# Patient Record
Sex: MALE | Race: WHITE | NOT HISPANIC OR LATINO | Employment: FULL TIME | URBAN - METROPOLITAN AREA
[De-identification: names, ages, dates, MRNs, and addresses within clinical notes are randomized per-mention and may not be internally consistent; named-entity substitution may affect disease eponyms.]

---

## 2019-04-09 ENCOUNTER — HOSPITAL ENCOUNTER (OUTPATIENT)
Dept: RADIOLOGY | Facility: MEDICAL CENTER | Age: 59
End: 2019-04-09
Attending: ORTHOPAEDIC SURGERY
Payer: COMMERCIAL

## 2019-04-09 DIAGNOSIS — M25.561 RIGHT KNEE PAIN, UNSPECIFIED CHRONICITY: ICD-10-CM

## 2019-04-09 PROCEDURE — 73721 MRI JNT OF LWR EXTRE W/O DYE: CPT | Mod: RT

## 2019-05-13 ENCOUNTER — APPOINTMENT (OUTPATIENT)
Dept: ADMISSIONS | Facility: MEDICAL CENTER | Age: 59
End: 2019-05-13
Attending: ORTHOPAEDIC SURGERY
Payer: COMMERCIAL

## 2019-05-13 NOTE — OR NURSING
"Reviewed \"Preparing for your surgery\", except advised to stay NPO after midnight according to Dr. Teran orders. Instructed to take pain meds DOS prn.  "

## 2019-05-14 ENCOUNTER — ANESTHESIA EVENT (OUTPATIENT)
Dept: SURGERY | Facility: MEDICAL CENTER | Age: 59
End: 2019-05-14
Payer: COMMERCIAL

## 2019-05-14 ENCOUNTER — ANESTHESIA (OUTPATIENT)
Dept: SURGERY | Facility: MEDICAL CENTER | Age: 59
End: 2019-05-14
Payer: COMMERCIAL

## 2019-05-14 ENCOUNTER — HOSPITAL ENCOUNTER (OUTPATIENT)
Facility: MEDICAL CENTER | Age: 59
End: 2019-05-14
Attending: ORTHOPAEDIC SURGERY | Admitting: ORTHOPAEDIC SURGERY
Payer: COMMERCIAL

## 2019-05-14 VITALS
HEART RATE: 59 BPM | OXYGEN SATURATION: 95 % | TEMPERATURE: 97.3 F | RESPIRATION RATE: 16 BRPM | HEIGHT: 75 IN | DIASTOLIC BLOOD PRESSURE: 88 MMHG | WEIGHT: 196.87 LBS | BODY MASS INDEX: 24.48 KG/M2 | SYSTOLIC BLOOD PRESSURE: 135 MMHG

## 2019-05-14 DIAGNOSIS — G89.18 POSTOPERATIVE PAIN: ICD-10-CM

## 2019-05-14 PROCEDURE — 160041 HCHG SURGERY MINUTES - EA ADDL 1 MIN LEVEL 4: Performed by: ORTHOPAEDIC SURGERY

## 2019-05-14 PROCEDURE — 700111 HCHG RX REV CODE 636 W/ 250 OVERRIDE (IP): Performed by: ANESTHESIOLOGY

## 2019-05-14 PROCEDURE — 700102 HCHG RX REV CODE 250 W/ 637 OVERRIDE(OP): Performed by: ANESTHESIOLOGY

## 2019-05-14 PROCEDURE — 160029 HCHG SURGERY MINUTES - 1ST 30 MINS LEVEL 4: Performed by: ORTHOPAEDIC SURGERY

## 2019-05-14 PROCEDURE — 160009 HCHG ANES TIME/MIN: Performed by: ORTHOPAEDIC SURGERY

## 2019-05-14 PROCEDURE — 700102 HCHG RX REV CODE 250 W/ 637 OVERRIDE(OP)

## 2019-05-14 PROCEDURE — 160002 HCHG RECOVERY MINUTES (STAT): Performed by: ORTHOPAEDIC SURGERY

## 2019-05-14 PROCEDURE — 700111 HCHG RX REV CODE 636 W/ 250 OVERRIDE (IP)

## 2019-05-14 PROCEDURE — A6222 GAUZE <=16 IN NO W/SAL W/O B: HCPCS | Performed by: ORTHOPAEDIC SURGERY

## 2019-05-14 PROCEDURE — A9270 NON-COVERED ITEM OR SERVICE: HCPCS

## 2019-05-14 PROCEDURE — 501838 HCHG SUTURE GENERAL: Performed by: ORTHOPAEDIC SURGERY

## 2019-05-14 PROCEDURE — 700105 HCHG RX REV CODE 258: Performed by: ANESTHESIOLOGY

## 2019-05-14 PROCEDURE — 160025 RECOVERY II MINUTES (STATS): Performed by: ORTHOPAEDIC SURGERY

## 2019-05-14 PROCEDURE — 700105 HCHG RX REV CODE 258: Performed by: ORTHOPAEDIC SURGERY

## 2019-05-14 PROCEDURE — 502580 HCHG PACK, KNEE ARTHROSCOPY: Performed by: ORTHOPAEDIC SURGERY

## 2019-05-14 PROCEDURE — 700101 HCHG RX REV CODE 250

## 2019-05-14 PROCEDURE — A9270 NON-COVERED ITEM OR SERVICE: HCPCS | Performed by: ANESTHESIOLOGY

## 2019-05-14 PROCEDURE — 700101 HCHG RX REV CODE 250: Performed by: ORTHOPAEDIC SURGERY

## 2019-05-14 PROCEDURE — 700101 HCHG RX REV CODE 250: Performed by: ANESTHESIOLOGY

## 2019-05-14 PROCEDURE — 160046 HCHG PACU - 1ST 60 MINS PHASE II: Performed by: ORTHOPAEDIC SURGERY

## 2019-05-14 PROCEDURE — 160035 HCHG PACU - 1ST 60 MINS PHASE I: Performed by: ORTHOPAEDIC SURGERY

## 2019-05-14 PROCEDURE — 160048 HCHG OR STATISTICAL LEVEL 1-5: Performed by: ORTHOPAEDIC SURGERY

## 2019-05-14 RX ORDER — OXYCODONE HYDROCHLORIDE 5 MG/1
5 TABLET ORAL EVERY 6 HOURS PRN
Qty: 20 TAB | Refills: 0 | Status: SHIPPED | OUTPATIENT
Start: 2019-05-14 | End: 2019-05-19

## 2019-05-14 RX ORDER — MIDAZOLAM HYDROCHLORIDE 1 MG/ML
1 INJECTION INTRAMUSCULAR; INTRAVENOUS
Status: DISCONTINUED | OUTPATIENT
Start: 2019-05-14 | End: 2019-05-14 | Stop reason: HOSPADM

## 2019-05-14 RX ORDER — SODIUM CHLORIDE, SODIUM LACTATE, POTASSIUM CHLORIDE, CALCIUM CHLORIDE 600; 310; 30; 20 MG/100ML; MG/100ML; MG/100ML; MG/100ML
INJECTION, SOLUTION INTRAVENOUS
Status: DISCONTINUED | OUTPATIENT
Start: 2019-05-14 | End: 2019-05-14 | Stop reason: SURG

## 2019-05-14 RX ORDER — LABETALOL HYDROCHLORIDE 5 MG/ML
5 INJECTION, SOLUTION INTRAVENOUS
Status: DISCONTINUED | OUTPATIENT
Start: 2019-05-14 | End: 2019-05-14 | Stop reason: HOSPADM

## 2019-05-14 RX ORDER — MEPERIDINE HYDROCHLORIDE 25 MG/ML
12.5 INJECTION INTRAMUSCULAR; INTRAVENOUS; SUBCUTANEOUS
Status: DISCONTINUED | OUTPATIENT
Start: 2019-05-14 | End: 2019-05-14 | Stop reason: HOSPADM

## 2019-05-14 RX ORDER — CELECOXIB 200 MG/1
400 CAPSULE ORAL ONCE
Status: COMPLETED | OUTPATIENT
Start: 2019-05-14 | End: 2019-05-14

## 2019-05-14 RX ORDER — BUPIVACAINE HYDROCHLORIDE AND EPINEPHRINE 2.5; 5 MG/ML; UG/ML
INJECTION, SOLUTION EPIDURAL; INFILTRATION; INTRACAUDAL; PERINEURAL
Status: DISCONTINUED | OUTPATIENT
Start: 2019-05-14 | End: 2019-05-14 | Stop reason: HOSPADM

## 2019-05-14 RX ORDER — DIAZEPAM 5 MG/1
5 TABLET ORAL ONCE
Status: COMPLETED | OUTPATIENT
Start: 2019-05-14 | End: 2019-05-14

## 2019-05-14 RX ORDER — KETOROLAC TROMETHAMINE 30 MG/ML
INJECTION, SOLUTION INTRAMUSCULAR; INTRAVENOUS PRN
Status: DISCONTINUED | OUTPATIENT
Start: 2019-05-14 | End: 2019-05-14 | Stop reason: SURG

## 2019-05-14 RX ORDER — CEFAZOLIN SODIUM 1 G/3ML
INJECTION, POWDER, FOR SOLUTION INTRAMUSCULAR; INTRAVENOUS PRN
Status: DISCONTINUED | OUTPATIENT
Start: 2019-05-14 | End: 2019-05-14 | Stop reason: SURG

## 2019-05-14 RX ORDER — HYDROMORPHONE HYDROCHLORIDE 1 MG/ML
0.4 INJECTION, SOLUTION INTRAMUSCULAR; INTRAVENOUS; SUBCUTANEOUS
Status: DISCONTINUED | OUTPATIENT
Start: 2019-05-14 | End: 2019-05-14 | Stop reason: HOSPADM

## 2019-05-14 RX ORDER — HYDRALAZINE HYDROCHLORIDE 20 MG/ML
5 INJECTION INTRAMUSCULAR; INTRAVENOUS
Status: DISCONTINUED | OUTPATIENT
Start: 2019-05-14 | End: 2019-05-14 | Stop reason: HOSPADM

## 2019-05-14 RX ORDER — HALOPERIDOL 5 MG/ML
1 INJECTION INTRAMUSCULAR
Status: DISCONTINUED | OUTPATIENT
Start: 2019-05-14 | End: 2019-05-14 | Stop reason: HOSPADM

## 2019-05-14 RX ORDER — DIPHENHYDRAMINE HYDROCHLORIDE 50 MG/ML
12.5 INJECTION INTRAMUSCULAR; INTRAVENOUS
Status: DISCONTINUED | OUTPATIENT
Start: 2019-05-14 | End: 2019-05-14 | Stop reason: HOSPADM

## 2019-05-14 RX ORDER — ACETAMINOPHEN 500 MG
1000 TABLET ORAL ONCE
Status: COMPLETED | OUTPATIENT
Start: 2019-05-14 | End: 2019-05-14

## 2019-05-14 RX ORDER — SODIUM CHLORIDE, SODIUM LACTATE, POTASSIUM CHLORIDE, CALCIUM CHLORIDE 600; 310; 30; 20 MG/100ML; MG/100ML; MG/100ML; MG/100ML
INJECTION, SOLUTION INTRAVENOUS CONTINUOUS
Status: DISCONTINUED | OUTPATIENT
Start: 2019-05-14 | End: 2019-05-14 | Stop reason: HOSPADM

## 2019-05-14 RX ORDER — OXYCODONE HCL 5 MG/5 ML
10 SOLUTION, ORAL ORAL
Status: DISCONTINUED | OUTPATIENT
Start: 2019-05-14 | End: 2019-05-14 | Stop reason: HOSPADM

## 2019-05-14 RX ORDER — HYDROMORPHONE HYDROCHLORIDE 1 MG/ML
0.2 INJECTION, SOLUTION INTRAMUSCULAR; INTRAVENOUS; SUBCUTANEOUS
Status: DISCONTINUED | OUTPATIENT
Start: 2019-05-14 | End: 2019-05-14 | Stop reason: HOSPADM

## 2019-05-14 RX ORDER — ONDANSETRON 2 MG/ML
4 INJECTION INTRAMUSCULAR; INTRAVENOUS
Status: DISCONTINUED | OUTPATIENT
Start: 2019-05-14 | End: 2019-05-14 | Stop reason: HOSPADM

## 2019-05-14 RX ORDER — OXYCODONE HCL 5 MG/5 ML
5 SOLUTION, ORAL ORAL
Status: DISCONTINUED | OUTPATIENT
Start: 2019-05-14 | End: 2019-05-14 | Stop reason: HOSPADM

## 2019-05-14 RX ORDER — OXYCODONE HCL 5 MG/5 ML
SOLUTION, ORAL ORAL
Status: COMPLETED
Start: 2019-05-14 | End: 2019-05-14

## 2019-05-14 RX ORDER — HYDROMORPHONE HYDROCHLORIDE 1 MG/ML
0.1 INJECTION, SOLUTION INTRAMUSCULAR; INTRAVENOUS; SUBCUTANEOUS
Status: DISCONTINUED | OUTPATIENT
Start: 2019-05-14 | End: 2019-05-14 | Stop reason: HOSPADM

## 2019-05-14 RX ADMIN — ROCURONIUM BROMIDE 30 MG: 10 INJECTION INTRAVENOUS at 17:23

## 2019-05-14 RX ADMIN — CEFAZOLIN 2 G: 1 INJECTION, POWDER, FOR SOLUTION INTRAVENOUS at 17:26

## 2019-05-14 RX ADMIN — FENTANYL CITRATE 50 MCG: 50 INJECTION INTRAMUSCULAR; INTRAVENOUS at 18:06

## 2019-05-14 RX ADMIN — HYDROMORPHONE HYDROCHLORIDE 0.2 MG: 1 INJECTION, SOLUTION INTRAMUSCULAR; INTRAVENOUS; SUBCUTANEOUS at 19:03

## 2019-05-14 RX ADMIN — CELECOXIB 400 MG: 200 CAPSULE ORAL at 16:53

## 2019-05-14 RX ADMIN — DIAZEPAM 5 MG: 5 TABLET ORAL at 16:54

## 2019-05-14 RX ADMIN — PROPOFOL 200 MG: 10 INJECTION, EMULSION INTRAVENOUS at 17:23

## 2019-05-14 RX ADMIN — SODIUM CHLORIDE, POTASSIUM CHLORIDE, SODIUM LACTATE AND CALCIUM CHLORIDE: 600; 310; 30; 20 INJECTION, SOLUTION INTRAVENOUS at 17:18

## 2019-05-14 RX ADMIN — OXYCODONE HYDROCHLORIDE 10 MG: 5 SOLUTION ORAL at 18:07

## 2019-05-14 RX ADMIN — LIDOCAINE HYDROCHLORIDE 0.5 ML: 10 INJECTION, SOLUTION INFILTRATION; PERINEURAL at 15:11

## 2019-05-14 RX ADMIN — ACETAMINOPHEN 1000 MG: 500 TABLET, FILM COATED ORAL at 16:53

## 2019-05-14 RX ADMIN — KETOROLAC TROMETHAMINE 30 MG: 30 INJECTION, SOLUTION INTRAMUSCULAR at 17:50

## 2019-05-14 RX ADMIN — LIDOCAINE HYDROCHLORIDE 100 MG: 20 INJECTION, SOLUTION INFILTRATION; PERINEURAL at 17:23

## 2019-05-14 RX ADMIN — HYDROMORPHONE HYDROCHLORIDE 0.4 MG: 1 INJECTION, SOLUTION INTRAMUSCULAR; INTRAVENOUS; SUBCUTANEOUS at 18:58

## 2019-05-14 RX ADMIN — SODIUM CHLORIDE, POTASSIUM CHLORIDE, SODIUM LACTATE AND CALCIUM CHLORIDE: 600; 310; 30; 20 INJECTION, SOLUTION INTRAVENOUS at 15:11

## 2019-05-14 RX ADMIN — FENTANYL CITRATE 50 MCG: 50 INJECTION INTRAMUSCULAR; INTRAVENOUS at 18:12

## 2019-05-14 RX ADMIN — FENTANYL CITRATE 100 MCG: 50 INJECTION, SOLUTION INTRAMUSCULAR; INTRAVENOUS at 17:30

## 2019-05-14 NOTE — ANESTHESIA PREPROCEDURE EVALUATION
Relevant Problems   No relevant active problems       Physical Exam    Airway   Mallampati: I  TM distance: >3 FB  Neck ROM: full       Cardiovascular - normal exam  Rhythm: regular  Rate: normal  (-) murmur     Dental - normal exam         Pulmonary - normal exam  Breath sounds clear to auscultation     Abdominal    Neurological - normal exam                 Anesthesia Plan    ASA 1       Plan - general       Airway plan will be LMA        Induction: intravenous    Postoperative Plan: Postoperative administration of opioids is intended.    Pertinent diagnostic labs and testing reviewed    Informed Consent:    Anesthetic plan and risks discussed with patient.

## 2019-05-14 NOTE — OR NURSING
Patient to preop, allergies and NPO status verified, home medications reconciled, belongings secured, verbalizes understanding of pain scale, surgical site verified, IV access established by HEIDY Figueroas in place, triple aim completed.

## 2019-05-15 NOTE — OP REPORT
DATE OF SERVICE:  05/14/2019    PREOPERATIVE DIAGNOSIS:  Right knee degenerative joint disease with medial and   lateral meniscal tears.    POSTOPERATIVE DIAGNOSES:  1.  Right knee degenerative medial and lateral meniscal tears.  2.  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  3.  Synovitis of the knee with a large cyclops lesion on the anterior aspect   of the knee from the ACL and anterior horn of the lateral meniscus and diffuse   synovitis throughout the knee.    PROCEDURES PERFORMED:  1.  Right knee diagnostic arthroscopy with arthroscopic partial medial and   lateral meniscectomies.  2.  Right knee arthroscopic chondroplasty of the patellofemoral, medial, and   lateral compartments.  3.  Right knee arthroscopic synovectomy.    SURGEON:  Freddy Teran MD    ASSISTANT:  Radha Cotto PA-C    ANESTHESIA:  General.    ANESTHESIOLOGIST:  Mikal Driscoll MD    IMPLANTS:  None.    COMPLICATIONS:  None.    DISPOSITION:  Stable to postanesthesia care unit.    INDICATIONS:  The patient has had progressive knee pain, which has been   unresponsive to conservative management.  He has had other arthroscopic   procedures in the past and he has known degenerative changes of the knee.  He   has been having mechanical symptoms and desired to have a knee arthroscopy to   address some of those mechanical symptoms and potentially buy him some time   before arthroplasty.  The risks, benefits, alternatives, and limitations of   the surgical intervention were discussed in detail.  He expressed   understanding and desired to proceed.    DESCRIPTION OF PROCEDURE:  The patient and the correct operative extremity   were identified in the preoperative area.  The knee was marked.  He was   brought to the operating room and the correct operative extremity was  again   confirmed.  He was placed supine on the OR table where he underwent general   anesthesia without complication.  Examination under anesthesia showed full   range of motion, no instability and a moderate effusion.  The knee was prepped   with alcohol and injected with 30 mL of 1% lidocaine with epinephrine.  The   knee was then prepped and draped in the usual sterile fashion using   ChloraPrep.  A diagnostic arthroscopy was then performed, which showed diffuse   grade III chondromalacia of the patella and the trochlea with areas of grade   IV chondromalacia and multiple grade III loose chondral flaps.  The notch   showed an intact ACL.  The medial compartment showed areas of grade IV   chondromalacia of the medial aspect of the medial femoral condyle and the   medial tibial plateau.  He had a horizontal cleavage tear of the posterior   horn of the medial meniscus and he had a previously partially resected   meniscus.  A revision partial medial meniscectomy was performed removing the   superior leaflet of the posterior horn horizontal cleavage tear.    Chondroplasty was performed of the medial femoral condyle and the medial   tibial plateau.  The lateral compartment showed areas of grade IV   chondromalacia along the medial aspect of the lateral femoral condyle and the   lateral tibial plateau, multiple areas of grade III loose chondral flaps,   complex tear of the posterior horn of the lateral meniscus.  Partial lateral   meniscectomy was performed with a duckbill resector and the arthroscopic   shaver.  There was a large ____ up area just anterior to the ACL and off of   the anterior horn of the lateral meniscus, which was pinching in the notch in   full extension and that area was debrided.  It was either a flap from the   anterior horn of the lateral meniscus or from the ACL.  There was diffuse   synovitis in the knee with multiple small loose chondral bodies throughout the   knee.  Synovectomy was  performed removing inflamed synovium from the notch,   the retropatellar fat pad, the medial and lateral compartments, suprapatellar   pouch just removing the loose tissue, trying not induce any significant   bleeding.  Chondroplasty was performed of the trochlea and the patella.  The   gutters were checked for loose bodies, none were identified.  A spinal needle   was placed into the suprapatellar pouch under direct vision.  The fluid was   removed from the knee.  The scope was withdrawn.  The portals were closed with   3-0 nylon.  The knee injected with a combination of 0.5% bupivacaine with   epinephrine and 1 mL of 80 mg/mL of Depo-Medrol.  Sterile dressings were   applied.  The knee was loosely overwrapped with an Ace wrap.  The patient was   then allowed to awake from anesthesia, transferred to his hospital cart, and   taken to the postanesthesia care unit in stable condition.  He tolerated the   procedure well.  There were no immediate complications.       ____________________________________     MAYITO CHAUHAN MD RD / ENEDINA    DD:  05/14/2019 17:55:32  DT:  05/14/2019 18:08:26    D#:  [id number]  Job#:  [id number]

## 2019-05-15 NOTE — ANESTHESIA POSTPROCEDURE EVALUATION
Patient: Jim Lee    Procedure Summary     Date:  05/14/19 Room / Location:   OR 04 / SURGERY [facility]    Anesthesia Start:  1718 Anesthesia Stop:  1801    Procedures:       ARTHROSCOPY, KNEE (Right Knee)      MENISCECTOMY, KNEE, MEDIAL-  PARTIAL, AND PARTIAL  LATERAL MENISCECTOMY (Knee)      CHONDROPLASTY-  AND REPAIRS AS INDICATED (Knee) Diagnosis:  (PAIN IN RIGHT KNEE)    Surgeon:  Freddy Teran M.D. Responsible Provider:  Mikal Driscoll M.D.    Anesthesia Type:  general ASA Status:  1          Final Anesthesia Type: general  Last vitals  BP   Blood Pressure: 135/88    Temp   36.8 °C (98.2 °F)    Pulse   Pulse: (!) 59   Resp   16    SpO2   98 %      Anesthesia Post Evaluation    Patient location during evaluation: PACU  Patient participation: complete - patient participated  Level of consciousness: awake and alert    Airway patency: patent  Anesthetic complications: no  Cardiovascular status: hemodynamically stable  Respiratory status: acceptable  Hydration status: euvolemic    PONV: none           Nurse Pain Score: 5 (NPRS)

## 2019-05-15 NOTE — OR NURSING
1757 To PACU from OR via ledyralina. Pt sleeping, respirations spontaneous and non-labored via LMA. Surgical dressing to right knee. Toes to affected extremity are pink and warm, brisk cap refill observed, pedal pulse palpable.    1759 Pt rouses spontaneously, LMA removed. Pt reports 9/10 pain to his right knee, denies nausea. Oral and IV pain medications given.    1810 Pt still reporting severe pain, another dose of IV pain medication given.    1815 Pt asking for a sandwich and the score to the hockey game. Pt requests his belongings bag and his glasses, pulls out his iPad and proceeds to watch said hockey game.     1825 Pt quietly watching hockey game.     1835 Report to JACEY Dailey.

## 2019-05-15 NOTE — ANESTHESIA TIME REPORT
Anesthesia Start and Stop Event Times     Date Time Event    5/14/2019 1718 Anesthesia Start     1801 Anesthesia Stop        Responsible Staff  05/14/19    Name Role Begin End    Mikal Driscoll M.D. Anesth 1718 1801        Preop Diagnosis (Free Text):  Pre-op Diagnosis     PAIN IN RIGHT KNEE        Preop Diagnosis (Codes):  Diagnosis Information     Diagnosis Code(s):         Post op Diagnosis  Pain in right knee      Premium Reason  A. 3PM - 7AM    Comments:

## 2019-05-15 NOTE — ANESTHESIA PROCEDURE NOTES
Airway  Date/Time: 5/14/2019 5:24 PM  Performed by: ASTRID MALONE  Authorized by: ASTRID MALONE     Location:  OR  Urgency:  Elective  Difficult Airway: No    Indications for Airway Management:  Anesthesia  Spontaneous Ventilation: absent    Sedation Level:  Deep  Preoxygenated: Yes    Mask Difficulty Assessment:  1 - vent by mask  Final Airway Type:  Supraglottic airway  Final Supraglottic Airway:  Standard LMA  SGA Size:  5  Number of Attempts at Approach:  1

## 2019-05-15 NOTE — ANESTHESIA QCDR
2019 [facility] Clinical Data Registry (for Quality Improvement)     Postoperative nausea/vomiting risk protocol (Adult = 18 yrs and Pediatric 3-17 yrs)- (430 and 463)  General inhalation anesthetic (NOT TIVA) with PONV risk factors: No  Provision of anti-emetic therapy with at least 2 different classes of agents: N/A  Patient DID NOT receive anti-emetic therapy and reason is documented in Medical Record: N/A    Multimodal Pain Management- (AQI59)  Patient undergoing Elective Surgery (i.e. Outpatient, or ASC, or Prescheduled Surgery prior to Hospital Admission): Yes  Use of Multimodal Pain Management, two or more drugs and/or interventions, NOT including systemic opioids: Yes   Exception: Documented allergy to multiple classes of analgesics:  N/A    PACU assessment of acute postoperative pain prior to Anesthesia Care End- Applies to Patients Age = 18- (ABG7)  Initial PACU pain score is which of the following: < 7/10  Patient unable to report pain score: N/A    Post-anesthetic transfer of care checklist/protocol to PACU/ICU- (426 and 427)  Upon conclusion of case, patient transferred to which of the following locations: PACU/Non-ICU  Use of transfer checklist/protocol: Yes  Exclusion: Service Performed in Patient Hospital Room (and thus did not require transfer): N/A    PACU Reintubation- (AQI31)  General anesthesia requiring endotracheal intubation (ETT) along with subsequent extubation in OR or PACU: No  Required reintubation in the PACU: N/A  Extubation was a planned trial documented in the medical record prior to removal of the original airway device: N/A    Unplanned admission to ICU related to anesthesia service up through end of PACU care- (MD51)  Unplanned admission to ICU (not initially anticipated at anesthesia start time): No

## 2019-05-15 NOTE — DISCHARGE INSTRUCTIONS
ACTIVITY: Rest and take it easy for the first 24 hours.  A responsible adult is recommended to remain with you during that time.  It is normal to feel sleepy.  We encourage you to not do anything that requires balance, judgment or coordination.    MILD FLU-LIKE SYMPTOMS ARE NORMAL. YOU MAY EXPERIENCE GENERALIZED MUSCLE ACHES, THROAT IRRITATION, HEADACHE AND/OR SOME NAUSEA.    FOR 24 HOURS DO NOT:  Drive, operate machinery or run household appliances.  Drink beer or alcoholic beverages.   Make important decisions or sign legal documents.    SPECIAL INSTRUCTIONS:  Ice and elevate extremity. Weight bear as tolerated, crutches for comfort.    DIET: To avoid nausea, slowly advance diet as tolerated, avoiding spicy or greasy foods for the first day.  Add more substantial food to your diet according to your physician's instructions.  Babies can be fed formula or breast milk as soon as they are hungry.  INCREASE FLUIDS AND FIBER TO AVOID CONSTIPATION.    SURGICAL DRESSING/BATHING: May remove dressings Thursday and shower with wound uncovered.  Apply bandaids after shower.  Do not soak or submerge incisions for two weeks.    FOLLOW-UP APPOINTMENT:  A follow-up appointment should be arranged with your doctor in 7-10 days; call to schedule.    You should CALL YOUR PHYSICIAN if you develop:  Fever greater than 101 degrees F.  Pain not relieved by medication, or persistent nausea or vomiting.  Excessive bleeding (blood soaking through dressing) or unexpected drainage from the wound.  Extreme redness or swelling around the incision site, drainage of pus or foul smelling drainage.  Inability to urinate or empty your bladder within 8 hours.  Problems with breathing or chest pain.    You should call 911 if you develop problems with breathing or chest pain.  If you are unable to contact your doctor or surgical center, you should go to the nearest emergency room or urgent care center.      Physician's telephone #: [phone] (  [place])    If any questions arise, call your doctor.  If your doctor is not available, please feel free to call the Surgical Center at [phone]. The Center is open Monday through Friday from 7AM to 7PM.  You can also call the HEALTH HOTLINE open 24 hours/day, 7 days/week and speak to a nurse at [phone], or toll free at [phone].    A registered nurse may call you a few days after your surgery to see how you are doing after your procedure.    MEDICATIONS: Resume taking daily medication.  Take prescribed pain medication with food.  If no medication is prescribed, you may take non-aspirin pain medication if needed.  PAIN MEDICATION CAN BE VERY CONSTIPATING.  Take a stool softener or laxative such as senokot, pericolace, or milk of magnesia if needed.    Prescription given oxycodone.  Last pain medication given at 6:07 pm.    If your physician has prescribed pain medication that includes Acetaminophen (Tylenol), do not take additional Acetaminophen (Tylenol) while taking the prescribed medication.    Depression / Suicide Risk    As you are discharged from this [facility] facility, it is important to learn how to keep safe from harming yourself.    Recognize the warning signs:  · Abrupt changes in personality, positive or negative- including increase in energy   · Giving away possessions  · Change in eating patterns- significant weight changes-  positive or negative  · Change in sleeping patterns- unable to sleep or sleeping all the time   · Unwillingness or inability to communicate  · Depression  · Unusual sadness, discouragement and loneliness  · Talk of wanting to die  · Neglect of personal appearance   · Rebelliousness- reckless behavior  · Withdrawal from people/activities they love  · Confusion- inability to concentrate     If you or a loved one observes any of these behaviors or has concerns about self-harm, here's what you can do:  · Talk about it- your feelings and reasons for harming  yourself  · Remove any means that you might use to hurt yourself (examples: pills, rope, extension cords, firearm)  · Get professional help from the community (Mental Health, Substance Abuse, psychological counseling)  · Do not be alone:Call your Safe Contact- someone whom you trust who will be there for you.  · Call your local CRISIS HOTLINE 152-3838 or 440-499-2031  · Call your local Children's Mobile Crisis Response Team [place] (901) 720-3193 or www.[employer]  · Call the toll free National Suicide Prevention Hotlines   · National Suicide Prevention Lifeline 154-949-RZGK (8380)  · National Hope Line Network 800-SUICIDE (783-2825)

## 2019-05-15 NOTE — OR NURSING
Patient OOB to recliner, grimacing, states pain in knee still high.  Dilaudid given with good effect. Palpable pedal pulses right foot, foot is warm and pink with brisk cap refill, ice to knee.  ACE wrap clean and dry.

## 2020-01-21 ENCOUNTER — HOSPITAL ENCOUNTER (OUTPATIENT)
Dept: RADIOLOGY | Facility: MEDICAL CENTER | Age: 60
End: 2020-01-21
Attending: INTERNAL MEDICINE
Payer: COMMERCIAL

## 2020-01-21 DIAGNOSIS — R10.30 INGUINAL PAIN, UNSPECIFIED LATERALITY: ICD-10-CM

## 2020-01-21 DIAGNOSIS — R63.0 ANOREXIA: ICD-10-CM

## 2020-01-21 DIAGNOSIS — R63.4 LOSS OF WEIGHT: ICD-10-CM

## 2020-01-21 DIAGNOSIS — R11.0 NAUSEA: ICD-10-CM

## 2020-01-21 PROCEDURE — 700117 HCHG RX CONTRAST REV CODE 255: Performed by: INTERNAL MEDICINE

## 2020-01-21 PROCEDURE — 74177 CT ABD & PELVIS W/CONTRAST: CPT

## 2020-01-21 RX ADMIN — IOHEXOL 100 ML: 350 INJECTION, SOLUTION INTRAVENOUS at 09:30

## 2020-01-21 RX ADMIN — IOHEXOL 50 ML: 240 INJECTION, SOLUTION INTRATHECAL; INTRAVASCULAR; INTRAVENOUS; ORAL at 09:30

## 2020-06-25 ENCOUNTER — APPOINTMENT (OUTPATIENT)
Dept: RADIOLOGY | Facility: MEDICAL CENTER | Age: 60
End: 2020-06-25
Attending: SPECIALIST
Payer: COMMERCIAL

## 2020-07-28 ENCOUNTER — HOSPITAL ENCOUNTER (OUTPATIENT)
Dept: RADIOLOGY | Facility: MEDICAL CENTER | Age: 60
End: 2020-07-28
Attending: NURSE PRACTITIONER
Payer: COMMERCIAL

## 2020-07-28 DIAGNOSIS — R91.1 PULMONARY NODULE/LESION, SOLITARY: ICD-10-CM

## 2020-07-28 PROCEDURE — 700117 HCHG RX CONTRAST REV CODE 255: Performed by: NURSE PRACTITIONER

## 2020-07-28 PROCEDURE — 71260 CT THORAX DX C+: CPT

## 2020-07-28 RX ADMIN — IOHEXOL 75 ML: 350 INJECTION, SOLUTION INTRAVENOUS at 09:49

## 2021-03-15 DIAGNOSIS — Z23 NEED FOR VACCINATION: ICD-10-CM

## 2023-10-25 ENCOUNTER — OFFICE VISIT (OUTPATIENT)
Dept: MEDICAL GROUP | Facility: MEDICAL CENTER | Age: 63
End: 2023-10-25
Payer: COMMERCIAL

## 2023-10-25 VITALS
HEIGHT: 75 IN | TEMPERATURE: 97.9 F | OXYGEN SATURATION: 97 % | BODY MASS INDEX: 25.59 KG/M2 | SYSTOLIC BLOOD PRESSURE: 110 MMHG | DIASTOLIC BLOOD PRESSURE: 78 MMHG | WEIGHT: 205.8 LBS | RESPIRATION RATE: 16 BRPM | HEART RATE: 68 BPM

## 2023-10-25 DIAGNOSIS — Z00.00 PREVENTATIVE HEALTH CARE: ICD-10-CM

## 2023-10-25 DIAGNOSIS — Z11.59 NEED FOR HEPATITIS C SCREENING TEST: ICD-10-CM

## 2023-10-25 DIAGNOSIS — J31.0 CHRONIC RHINITIS: ICD-10-CM

## 2023-10-25 DIAGNOSIS — G47.09 OTHER INSOMNIA: ICD-10-CM

## 2023-10-25 DIAGNOSIS — Z12.83 SCREENING EXAM FOR SKIN CANCER: ICD-10-CM

## 2023-10-25 DIAGNOSIS — B07.9 WART OF HAND: ICD-10-CM

## 2023-10-25 DIAGNOSIS — B35.1 ONYCHOMYCOSIS: ICD-10-CM

## 2023-10-25 PROCEDURE — 3078F DIAST BP <80 MM HG: CPT | Performed by: PHYSICIAN ASSISTANT

## 2023-10-25 PROCEDURE — 3074F SYST BP LT 130 MM HG: CPT | Performed by: PHYSICIAN ASSISTANT

## 2023-10-25 PROCEDURE — 99204 OFFICE O/P NEW MOD 45 MIN: CPT | Mod: 25 | Performed by: PHYSICIAN ASSISTANT

## 2023-10-25 PROCEDURE — 17110 DESTRUCTION B9 LES UP TO 14: CPT | Performed by: PHYSICIAN ASSISTANT

## 2023-10-25 RX ORDER — TRAZODONE HYDROCHLORIDE 50 MG/1
50 TABLET ORAL NIGHTLY
Qty: 30 TABLET | Refills: 3 | Status: SHIPPED | OUTPATIENT
Start: 2023-10-25

## 2023-10-25 RX ORDER — ALPRAZOLAM 0.5 MG/1
.25-.5 TABLET ORAL NIGHTLY PRN
Qty: 10 TABLET | Refills: 0 | Status: SHIPPED | OUTPATIENT
Start: 2023-10-25 | End: 2023-11-24

## 2023-10-25 RX ORDER — CICLOPIROX 80 MG/ML
SOLUTION TOPICAL
Qty: 6.6 ML | Refills: 5 | Status: SHIPPED | OUTPATIENT
Start: 2023-10-25

## 2023-10-25 ASSESSMENT — PATIENT HEALTH QUESTIONNAIRE - PHQ9: CLINICAL INTERPRETATION OF PHQ2 SCORE: 0

## 2023-10-25 NOTE — LETTER
[facility]  Bonita Freeman P.A.-C.  [address]  [place] [address]  Fax: [phone]   Authorization for Release/Disclosure of   Protected Health Information   Name: GABI LYN : 1960 SSN: [id number]   Address: [address] [place] [address] [place] [address] Phone:    [phone] (home) [phone] (work)   I authorize the entity listed below to release/disclose the PHI below to:   [facility]/Bonita Freeman P.A.-C. and Bonita Freeman P.A.-C.   Provider or Entity Name:  [facility]   Address   [facility], [address]            [address] [place] [address] Phone:  [phone]      Fax:       [phone]        Reason for request: continuity of care   Information to be released:    [ X ] LAST COLONOSCOPY,  including any PATH REPORT and follow-up  [ X ] LAST FIT/COLOGUARD RESULT [  ] LAST DEXA  [  ] LAST MAMMOGRAM  [  ] LAST PAP  [  ] LAST LABS [  ] RETINA EXAM REPORT  [  ] IMMUNIZATION RECORDS  [  ] Release all info      [  ] Check here and initial the line next to each item to release ALL health information INCLUDING  _____ Care and treatment for drug and / or alcohol abuse  _____ HIV testing, infection status, or AIDS  _____ Genetic Testing    DATES OF SERVICE OR TIME PERIOD TO BE DISCLOSED: _____________  I understand and acknowledge that:  * This Authorization may be revoked at any time by you in writing, except if your health information has already been used or disclosed.  * Your health information that will be used or disclosed as a result of you signing this authorization could be re-disclosed by the recipient. If this occurs, your re-disclosed health information may no longer be protected by State or Federal laws.  * You may refuse to sign this Authorization. Your refusal will not affect your ability to obtain treatment.  * This Authorization becomes effective upon signing and will  on (date) __________.      If no date is indicated, this  Authorization will  one (1) year from the signature date.    Name: Jim Lee    Signature:   Date:     10/25/2023       PLEASE FAX REQUESTED RECORDS BACK TO: [phone]

## 2023-10-25 NOTE — PROGRESS NOTES
"Chief Complaint   Patient presents with    Establish Care    Nasal Congestion     \"Consistent\"       HPI  Jim Lee is a 63 y.o. male here today for establishing care.    Patient has chronic rhinitis, runny nose and clogged nose, switched sides.  Has been going on for many years.  Has had allergy testing in the past without any conclusion.  Does not take take medicine for it.    Patient has a wart over middle right hand, has been shaving it at home, not resolving.    Patient has bilateral toe fungus, many years, not improving and not getting worse.    States he has some anxiety that has been well controlled without any medicine however he does have a hard time falling asleep and staying asleep some nights.  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  Psych: Eye contact is good, speech goal directed, affect calm  Eyes: Conjunctiva non-injected, sclera non-icteric.  Lungs: Unlabored respiratory effort, clear to auscultation bilaterally with good excursion, no wheez or rhonci  CV: regular rate and rhythm. No lower extremity edema  Ears:  External ears unremarkable. TMs pearly gray, clear and intact, w/o any perforation or effusion.  Skin/ nail: Verrucous growth over right middle finger  Hypertrophic nails with yellow discoloration over bilateral toenails        A/P:  1. Preventative health care    - Comp Metabolic Panel; Future  - CBC WITH DIFFERENTIAL; Future  - Lipid Profile; Future  - PROSTATE SPECIFIC AG SCREENING; Future  - VITAMIN D,25 HYDROXY (DEFICIENCY); Future  - TSH WITH REFLEX TO FT4; Future    2. Need for hepatitis C screening test    - HEP C VIRUS ANTIBODY; Future    3. Chronic rhinitis  Discussed daily antihistamine, Flonase, nasal " saline, quercetin,  Discussed daily montelukast.  Patient does not want to take daily medicine however will try OTC    - Referral to ENT    4. Wart of hand     CRYOTHERAPY:  Liquid nitrogen was applied for 10-12 seconds to the skin lesion and the expected blistering or scabbing reaction explained. Patient reminded not pick at the area and to expect hypopigmented scars from the procedure. Return if lesion fails to fully resolve.      5. Screening exam for skin cancer    - Referral to Dermatology    6. Onychomycosis  We can consider oral medicine after liver enzymes are checked  - ciclopirox (PENLAC) 8 % solution; Apply to adjacent skin and affected nails daily. Remove with alcohol every 7 days.  Dispense: 6.6 mL; Refill: 5    7. Other insomnia    - ALPRAZolam (XANAX) 0.5 MG Tab; Take 0.5-1 Tablets by mouth at bedtime as needed for Sleep for up to 30 days.  Dispense: 10 Tablet; Refill: 0  - traZODone (DESYREL) 50 MG Tab; Take 1 Tablet by mouth every evening.  Dispense: 30 Tablet; Refill: 3        F/U: 8 wks for f/u and annual exam

## 2024-01-09 ENCOUNTER — APPOINTMENT (OUTPATIENT)
Dept: MEDICAL GROUP | Facility: MEDICAL CENTER | Age: 64
End: 2024-01-09
Payer: COMMERCIAL

## 2024-07-31 ENCOUNTER — APPOINTMENT (RX ONLY)
Dept: URBAN - METROPOLITAN AREA CLINIC 6 | Facility: CLINIC | Age: 64
Setting detail: DERMATOLOGY
End: 2024-07-31

## 2024-07-31 DIAGNOSIS — L82.1 OTHER SEBORRHEIC KERATOSIS: ICD-10-CM

## 2024-07-31 DIAGNOSIS — L57.0 ACTINIC KERATOSIS: ICD-10-CM

## 2024-07-31 PROCEDURE — 17000 DESTRUCT PREMALG LESION: CPT

## 2024-07-31 PROCEDURE — ? LIQUID NITROGEN

## 2024-07-31 PROCEDURE — ? COUNSELING

## 2024-07-31 PROCEDURE — 99202 OFFICE O/P NEW SF 15 MIN: CPT | Mod: 25

## 2024-07-31 ASSESSMENT — LOCATION DETAILED DESCRIPTION DERM
LOCATION DETAILED: LEFT MEDIAL SUPERIOR CHEST
LOCATION DETAILED: RIGHT CLAVICULAR SKIN

## 2024-07-31 ASSESSMENT — LOCATION ZONE DERM: LOCATION ZONE: TRUNK

## 2024-07-31 ASSESSMENT — LOCATION SIMPLE DESCRIPTION DERM
LOCATION SIMPLE: CHEST
LOCATION SIMPLE: RIGHT CLAVICULAR SKIN

## 2024-07-31 NOTE — PROCEDURE: LIQUID NITROGEN
Show Applicator Variable?: Yes
Consent: The patient's verbal consent was obtained including but not limited to risks of crusting, scabbing, blistering, scarring, darker or lighter pigmentary change, recurrence, incomplete removal and infection.
Number Of Freeze-Thaw Cycles: 2 freeze-thaw cycles
Render Post-Care Instructions In Note?: no
Detail Level: Detailed
Duration Of Freeze Thaw-Cycle (Seconds): 8
Post-Care Instructions: I reviewed with the patient in detail post-care instructions. Patient is to wear sunprotection, and avoid picking at any of the treated lesions. Pt may apply Vaseline to crusted or scabbing areas.

## 2024-10-03 ENCOUNTER — OFFICE VISIT (OUTPATIENT)
Dept: MEDICAL GROUP | Facility: MEDICAL CENTER | Age: 64
End: 2024-10-03
Payer: COMMERCIAL

## 2024-10-03 VITALS
HEART RATE: 94 BPM | BODY MASS INDEX: 26.31 KG/M2 | HEIGHT: 75 IN | WEIGHT: 211.6 LBS | DIASTOLIC BLOOD PRESSURE: 60 MMHG | TEMPERATURE: 99.2 F | SYSTOLIC BLOOD PRESSURE: 116 MMHG | OXYGEN SATURATION: 98 %

## 2024-10-03 DIAGNOSIS — I83.813 VARICOSE VEINS OF BOTH LOWER EXTREMITIES WITH PAIN: ICD-10-CM

## 2024-10-03 DIAGNOSIS — R06.00 PND (PAROXYSMAL NOCTURNAL DYSPNEA): ICD-10-CM

## 2024-10-03 DIAGNOSIS — Z00.00 PREVENTATIVE HEALTH CARE: ICD-10-CM

## 2024-10-03 DIAGNOSIS — G47.9 SLEEP DISORDER: ICD-10-CM

## 2024-10-03 DIAGNOSIS — F41.8 SITUATIONAL ANXIETY: ICD-10-CM

## 2024-10-03 PROCEDURE — 3074F SYST BP LT 130 MM HG: CPT | Performed by: PHYSICIAN ASSISTANT

## 2024-10-03 PROCEDURE — 99214 OFFICE O/P EST MOD 30 MIN: CPT | Performed by: PHYSICIAN ASSISTANT

## 2024-10-03 PROCEDURE — 3078F DIAST BP <80 MM HG: CPT | Performed by: PHYSICIAN ASSISTANT

## 2024-10-03 RX ORDER — ALPRAZOLAM 0.5 MG
0.5 TABLET ORAL NIGHTLY PRN
Qty: 30 TABLET | Refills: 0 | Status: SHIPPED | OUTPATIENT
Start: 2024-10-03 | End: 2024-11-02

## 2024-10-03 RX ORDER — MONTELUKAST SODIUM 10 MG/1
10 TABLET ORAL DAILY
Qty: 30 TABLET | Refills: 2 | Status: SHIPPED | OUTPATIENT
Start: 2024-10-03

## 2024-10-03 RX ORDER — TRAZODONE HYDROCHLORIDE 50 MG/1
50 TABLET, FILM COATED ORAL NIGHTLY PRN
Qty: 90 TABLET | Refills: 2 | Status: SHIPPED | OUTPATIENT
Start: 2024-10-03

## 2024-10-03 ASSESSMENT — PATIENT HEALTH QUESTIONNAIRE - PHQ9: CLINICAL INTERPRETATION OF PHQ2 SCORE: 0

## 2025-05-20 ENCOUNTER — HOSPITAL ENCOUNTER (OUTPATIENT)
Dept: RADIOLOGY | Facility: MEDICAL CENTER | Age: 65
End: 2025-05-20
Attending: PHYSICAL MEDICINE & REHABILITATION
Payer: COMMERCIAL

## 2025-05-20 ENCOUNTER — OFFICE VISIT (OUTPATIENT)
Dept: PHYSICAL MEDICINE AND REHAB | Facility: MEDICAL CENTER | Age: 65
End: 2025-05-20
Payer: COMMERCIAL

## 2025-05-20 VITALS
BODY MASS INDEX: 25.36 KG/M2 | SYSTOLIC BLOOD PRESSURE: 136 MMHG | TEMPERATURE: 98.3 F | OXYGEN SATURATION: 95 % | DIASTOLIC BLOOD PRESSURE: 86 MMHG | HEIGHT: 75 IN | WEIGHT: 204 LBS | HEART RATE: 91 BPM

## 2025-05-20 DIAGNOSIS — M25.859 HIP IMPINGEMENT SYNDROME, UNSPECIFIED LATERALITY: ICD-10-CM

## 2025-05-20 DIAGNOSIS — G89.29 CHRONIC BILATERAL LOW BACK PAIN WITHOUT SCIATICA: ICD-10-CM

## 2025-05-20 DIAGNOSIS — M16.0 ARTHRITIS OF BOTH HIPS: ICD-10-CM

## 2025-05-20 DIAGNOSIS — M25.551 CHRONIC PAIN OF RIGHT HIP: Primary | ICD-10-CM

## 2025-05-20 DIAGNOSIS — G89.29 CHRONIC PAIN OF RIGHT HIP: ICD-10-CM

## 2025-05-20 DIAGNOSIS — G89.29 CHRONIC PAIN OF RIGHT HIP: Primary | ICD-10-CM

## 2025-05-20 DIAGNOSIS — M54.50 CHRONIC BILATERAL LOW BACK PAIN WITHOUT SCIATICA: ICD-10-CM

## 2025-05-20 DIAGNOSIS — B35.1 ONYCHOMYCOSIS: ICD-10-CM

## 2025-05-20 DIAGNOSIS — M25.551 CHRONIC PAIN OF RIGHT HIP: ICD-10-CM

## 2025-05-20 PROCEDURE — 72110 X-RAY EXAM L-2 SPINE 4/>VWS: CPT

## 2025-05-20 PROCEDURE — 3075F SYST BP GE 130 - 139MM HG: CPT | Performed by: PHYSICAL MEDICINE & REHABILITATION

## 2025-05-20 PROCEDURE — 99204 OFFICE O/P NEW MOD 45 MIN: CPT | Performed by: PHYSICAL MEDICINE & REHABILITATION

## 2025-05-20 PROCEDURE — 73522 X-RAY EXAM HIPS BI 3-4 VIEWS: CPT

## 2025-05-20 PROCEDURE — 3079F DIAST BP 80-89 MM HG: CPT | Performed by: PHYSICAL MEDICINE & REHABILITATION

## 2025-05-20 RX ORDER — CICLOPIROX 80 MG/ML
SOLUTION TOPICAL
Qty: 6.6 ML | Refills: 5 | Status: SHIPPED | OUTPATIENT
Start: 2025-05-20

## 2025-05-20 ASSESSMENT — PAIN SCALES - GENERAL: PAINLEVEL_OUTOF10: 3=SLIGHT PAIN

## 2025-05-20 ASSESSMENT — PATIENT HEALTH QUESTIONNAIRE - PHQ9: CLINICAL INTERPRETATION OF PHQ2 SCORE: 0

## 2025-05-20 NOTE — Clinical Note
REFERRAL APPROVAL NOTICE         Sent on May 20, 2025                   Jim Lee  [address] [place] [address]                   Dear Mr. Lee,    After a careful review of the medical information and benefit coverage, [facility] has processed your referral. See below for additional details.    If applicable, you must be actively enrolled with your insurance for coverage of the authorized service. If you have any questions regarding your coverage, please contact your insurance directly.    REFERRAL INFORMATION   Referral #:  [id number]  Referred-To Department    Referred-By Provider:  Physical Medicine and Rehab    Patient Self-Referred   Physiatry [facility] [address]      [place]  Referring provider phone N/A [address] [place] [address]  [place] [address] [phone]    Referral Start Date:  05/20/2025  Referral End Date:   05/20/2026             SCHEDULING  If you do not already have an appointment, please call [phone] to make an appointment.     MORE INFORMATION  If you do not already have a [email or web address] account, sign up at: [email or web address]  You can access your medical information, make appointments, see lab results, billing information, and more.  If you have questions regarding this referral, please contact  the [facility] Referrals department at:             [phone]. Monday - Friday 8:00AM - 5:00PM.     Sincerely,    [facility]

## 2025-05-20 NOTE — PROGRESS NOTES
[facility] (Physical Medicine and Rehabilitation)  Interventional Pain and Spine   New Patient Visit      Date of service: See epic    Chief complaint:   Chief Complaint   Patient presents with    New Patient     Back pain        Referring provider: No ref. provider found     HISTORY    HPI: Jim Lee 65 y.o.  who presents today with The primary encounter diagnosis was Chronic pain of right hip. Diagnoses of Hip impingement syndrome, Arthritis of both hips, and Chronic bilateral low back pain without sciatica were also pertinent to this visit.    HPI    Verbal consent was obtained for Christian copilot: Yes      History of Present Illness  The patient is a 65-year-old male with a 10-year history of chronic right-sided low back pain, right hip pain, and right groin pain. The pain is most severe in the morning, often awakening him, and is rated at 7/10 in intensity. He previously received a neural blockade at [facility]. The patient works as a  and engages in regular physical activity, including playing pickleball.    Chronic Right-Sided Low Back Pain, Right Hip Pain, and Right Groin Pain  He reports right hip and groin pain, which varies from a dull ache to a sharp sensation, and sharp lower back pain that is associated with the hip and groin pain. The pain is exacerbated by pressure and certain movements, particularly during daily activities such as playing pickleball or tennis, and sleeping on an uncomfortable bed. The pain disrupts his sleep, and he generally experiences poor sleep quality.  - Onset: 10-year history.  - Location: Right-sided low back, right hip, and right groin.  - Duration: Chronic, ongoing for 10 years.  - Character: Varies from a dull ache to a sharp sensation; sharp lower back pain associated with hip and groin pain.  - Alleviating/Aggravating Factors: Exacerbated by pressure and certain movements, playing pickleball or tennis, and sleeping on an uncomfortable bed.  -  Timing: Most severe in the morning, often awakening him.  - Severity: Rated at 7/10 in intensity; disrupts sleep and results in poor sleep quality.    Post-Surgical Soreness and Stiffness in Legs  The patient has a history of multiple knee surgeries and bilateral knee arthroplasty, with the most recent procedure occurring 5-6 years ago, and he has had no issues post-replacement. He recently underwent a venous procedure on his legs, resulting in soreness and stiffness. He reports some hip pain but does not currently warrant hip replacement. He is concerned about potential ancillary issues.  - Onset: Most recent knee arthroplasty 5-6 years ago; recent venous procedure.  - Location: Legs, hip.  - Duration: Ongoing post-surgical soreness and stiffness.  - Character: Soreness and stiffness in legs; some hip pain.  - Severity: Does not currently warrant hip replacement.    In 2016, he received a lumbar injection at [facility], which provided relief for several years.    FAMILY HISTORY  His father had a hip replacement in his 70s.       Conservative treatments including the past two months within the past six months  NSAIDs: yes  Acetaminophen: yes  Home exercise program or physical therapy: yes  Activity modification: yes           ROS:   Red Flags ROS:   Fever, Chills, Sweats: Denies  Involuntary Weight Loss: Denies  Bladder Incontinence: Denies  Bowel Incontinence: denies  Saddle Anesthesia: Denies    All other systems reviewed and negative.       PMHx:   Past Medical History[1]      Medications Ordered Prior to Encounter[2]     PSHx:   Past Surgical History[3]    Family history   Family History   Problem Relation Age of Onset    Cancer Father         aucostic neuromma, lung cancer         Medications: reviewed on epic.   Active Medications[4]     Allergies:   Allergies[5]    Social Hx:   Social History     Socioeconomic History    Marital status: Single     Spouse name: Not on file    Number of children: Not  "on file    Years of education: Not on file    Highest education level: Not on file   Occupational History    Occupation:    Tobacco Use    Smoking status: Never    Smokeless tobacco: Never   Vaping Use    Vaping status: Never Used   Substance and Sexual Activity    Alcohol use: Yes     Comment: 4 per week    Drug use: Not Currently     Comment: PRN CBD Gummy    Sexual activity: Not Currently     Partners: Female   Other Topics Concern    Not on file   Social History Narrative    Not on file     Social Drivers of Health     Financial Resource Strain: Not on File (2019)    Received from [employer]    Financial Resource Strain     Financial Resource Strain: 0   Food Insecurity: Not on File (2019)    Received from [employer]    Food Insecurity     Food: 0   Transportation Needs: Not on File (2019)    Received from [employer]    Transportation Needs     Transportation: 0   Physical Activity: Not on File (2019)    Received from [employer]    Physical Activity     Physical Activity: 0   Stress: Not on File (2019)    Received from [employer]    Stress     Stress: 0   Social Connections: Not on File (2019)    Received from [employer]    Social Connections     Social Connections and Isolation: 0   Intimate Partner Violence: Not on file   Housing Stability: Not on File (2019)    Received from [employer]    Housing Stability     Housin         EXAMINATION     Physical Exam:   Vitals: /86   Pulse 91   Temp 36.8 °C (98.3 °F) (Temporal)   Ht 1.905 m (6' 3\")   Wt 92.5 kg (204 lb)   SpO2 95%     Constitutional:   Body Habitus: Body mass index is 25.5 kg/m².  Cooperation: Fully cooperates with exam  Appearance: Well-groomed, well-nourished, not disheveled     Eyes: No scleral icterus to suggest severe liver disease, no proptosis to suggest severe hyperthyroid    ENT -no obvious auditory deficits, no obvious tongue lesions, tongue midline, no facial droop     Skin -no rashes or lesions noted " "    Respiratory-  breathing comfortable on room air, no audible wheezing    Cardiovascular- capillary refills less than 2 seconds.     Psychiatric- alert and oriented ×3. Normal affect.     Gait - normal gait, no use of ambulatory device, nonantalgic.     Musculoskeletal and Neuro -     Physical Exam  Decreased range of motion in lumbar spine. Facet loading maneuver positive on right, negative on left. Decreased range of motion in bilateral hips. Rosalia's maneuver positive for right anterior hip pain. Thigh thrust positive for anterior hip pain. No tenderness to palpation throughout spine or sacroiliac joints.         MEDICAL DECISION MAKING    Medical records review: see under HPI section.     DATA    Labs:   Lab Results   Component Value Date/Time    SODIUM 138 03/06/2014 06:38 AM    POTASSIUM 4.0 03/06/2014 06:38 AM    CHLORIDE 108 03/06/2014 06:38 AM    CO2 24 03/06/2014 06:38 AM    ANION 6.0 03/06/2014 06:38 AM    GLUCOSE 91 03/06/2014 06:38 AM    BUN 20 03/06/2014 06:38 AM    CREATININE 1.04 03/06/2014 06:38 AM    CALCIUM 8.4 03/06/2014 06:38 AM   ]    No results found for: \"PROTHROMBTM\", \"INR\"     Lab Results   Component Value Date/Time    WBC 6.4 08/08/2023 09:13 AM    WBC 6.9 03/06/2014 12:30 AM    RBC 4.34 (L) 03/06/2014 12:30 AM    HEMOGLOBIN 15.3 08/08/2023 09:13 AM    HEMOGLOBIN 14.3 03/06/2014 12:30 AM    HEMATOCRIT 45.2 08/08/2023 09:13 AM    HEMATOCRIT 40.7 (L) 03/06/2014 12:30 AM    MCV 98 08/08/2023 09:13 AM    MCV 93.8 03/06/2014 12:30 AM    MCH 33.1 (H) 08/08/2023 09:13 AM    MCH 32.9 03/06/2014 12:30 AM    MCHC 33.8 08/08/2023 09:13 AM    MCHC 35.1 (H) 03/06/2014 12:30 AM    MPV 10.5 (H) 03/06/2014 12:30 AM    NEUTSPOLYS 67 08/08/2023 09:13 AM    NEUTSPOLYS 56.8 03/06/2014 12:30 AM    LYMPHOCYTES 21 08/08/2023 09:13 AM    LYMPHOCYTES 30.4 03/06/2014 12:30 AM    MONOCYTES 8 08/08/2023 09:13 AM    MONOCYTES 8.9 03/06/2014 12:30 AM    EOSINOPHILS 3 08/08/2023 09:13 AM    EOSINOPHILS 3.2 03/06/2014 " "12:30 AM    BASOPHILS 1 08/08/2023 09:13 AM    BASOPHILS 0.7 03/06/2014 12:30 AM        No results found for: \"HBA1C\"     Imaging:   I personally reviewed following images, these are my reads      Results  - CT abdomen and pelvis (01/21/2020):    - Bilateral hip impingement syndrome    - Pit in bilateral anterofemoral head neck junction    - Degenerative changes in acetabulum    - Cam deformity         IMAGING radiology reads. I reviewed the following radiology reads                                                                Results for orders placed in visit on 07/25/23    DX-ANKLE 3+ VIEWS LEFT         Results for orders placed in visit on 01/13/23    DX-ELBOW-COMPLETE 3+ LEFT      Results for orders placed in visit on 07/03/21    DX-FOOT-COMPLETE 3+ LEFT                                      Diagnosis   Visit Diagnoses     ICD-10-CM   1. Chronic pain of right hip  M25.551    G89.29   2. Hip impingement syndrome  M25.859   3. Arthritis of both hips  M16.0   4. Chronic bilateral low back pain without sciatica  M54.50    G89.29           ASSESSMENT AND PLAN:  Jim Pisano Rosa 65 y.o. male      Jim was seen today for new patient.    Diagnoses and all orders for this visit:    Chronic pain of right hip  -     DX-HIP-BILATERAL-WITH PELVIS-3/4 VIEWS; Future  -     [facility] Procedure; Future    Hip impingement syndrome  -     DX-HIP-BILATERAL-WITH PELVIS-3/4 VIEWS; Future  -     [facility] Hospital Procedure; Future    Arthritis of both hips  -     DX-HIP-BILATERAL-WITH PELVIS-3/4 VIEWS; Future  -     [facility] Hospital Procedure; Future    Chronic bilateral low back pain without sciatica  -     DX-LUMBAR SPINE-4+ VIEWS; Future            Assessment & Plan  Chronic right-sided low back, right hip, and right groin pain: Chronic. CT scan (01/21/2020) showing cam deformity and degenerative changes in acetabulum. Primary issue related to hip joint. Possible contributing factor from back due to degenerative changes.  - Obtain " updated x-ray for further evaluation  - Continue current activities as tolerated  - Plan on right intra-articular hip injection for diagnostic and therapeutic purposes with fluoroscopic guidance  - We discussed the risk, benefits and alternatives  - Monitor for potential need for hip replacement in the future  - Request records from [facility]    Follow-up  - After the above procedure    PROCEDURE  Received nerve block in back at [facility] in 2016, providing relief for several years.    History of multiple knee surgeries and bilateral knee replacements, most recent 5-6 years ago.    Recently underwent vein work on legs.          Please note that this dictation was created using voice recognition software. I have made every reasonable attempt to correct obvious errors but there may be errors of grammar and content that I may have overlooked prior to finalization of this note.      Higinio Harrison MD  Physical Medicine and Rehabilitation  Interventional Spine and Sports Physiatry  [facility]          Bonita Freeman P.A.-C.          [1]   Past Medical History:  Diagnosis Date    Anxiety     Arthritis 05/2019    left knee   [2]   Current Outpatient Medications on File Prior to Visit   Medication Sig Dispense Refill    traZODone (DESYREL) 50 MG Tab Take 1 Tablet by mouth at bedtime as needed for Sleep. 90 Tablet 2    traZODone (DESYREL) 50 MG Tab Take 1 Tablet by mouth every evening. 30 Tablet 3    montelukast (SINGULAIR) 10 MG Tab Take 1 Tablet by mouth every day. (Patient not taking: Reported on 5/20/2025) 30 Tablet 2    MELATONIN PO Take  by mouth.       No current facility-administered medications on file prior to visit.   [3]   Past Surgical History:  Procedure Laterality Date    KNEE ARTHROSCOPY Right 5/14/2019    Procedure: ARTHROSCOPY, KNEE;  Surgeon: Freddy Teran M.D.;  Location: SURGERY [facility];  Service: Orthopedics    MENISCECTOMY, KNEE, MEDIAL  5/14/2019     Procedure: MENISCECTOMY, KNEE, MEDIAL-  PARTIAL, AND PARTIAL  LATERAL MENISCECTOMY;  Surgeon: Freddy Teran M.D.;  Location: SURGERY [facility];  Service: Orthopedics    CHONDROPLASTY  5/14/2019    Procedure: CHONDROPLASTY-  AND REPAIRS AS INDICATED;  Surgeon: Freddy Teran M.D.;  Location: SURGERY [facility];  Service: Orthopedics    HARDWARE REMOVAL ORTHO Left 12/2018    clavicle    ORIF, SHOULDER Left 11/2017    plates in shoulder    ORIF, FRACTURE, CLAVICLE Left 11/2017    plate in clavicle    KNEE ARTHROPLASTY TOTAL Left 2014   [4]   Outpatient Medications Marked as Taking for the 5/20/25 encounter (Office Visit) with Higinio Harrison M.D.   Medication Sig Dispense Refill    traZODone (DESYREL) 50 MG Tab Take 1 Tablet by mouth at bedtime as needed for Sleep. 90 Tablet 2    traZODone (DESYREL) 50 MG Tab Take 1 Tablet by mouth every evening. 30 Tablet 3   [5] No Known Allergies

## 2025-06-04 NOTE — Clinical Note
REFERRAL APPROVAL NOTICE         Sent on June 4, 2025                   Jim Lee  [address] [place] [address]                   Dear Mr. Lee,    After a careful review of the medical information and benefit coverage, [facility] has processed your referral. See below for additional details.    If applicable, you must be actively enrolled with your insurance for coverage of the authorized service. If you have any questions regarding your coverage, please contact your insurance directly.    REFERRAL INFORMATION   Referral #:  [id number]  Referred-To Department    Referred-By Provider:  Pain Management    Higinio Harrison M.D.   Pain Management       [address]  [address]  [place] [address]  [phone] [facility]  [place] [address] [phone]    Referral Start Date:  05/20/2025  Referral End Date:   09/04/2025             SCHEDULING  If you do not already have an appointment, please call [phone] to make an appointment.     MORE INFORMATION  If you do not already have a "[employer] account, sign up at: [email or web address]  You can access your medical information, make appointments, see lab results, billing information, and more.  If you have questions regarding this referral, please contact  the [facility] Referrals department at:             [phone]. Monday - Friday 8:00AM - 5:00PM.     Sincerely,    [facility]

## 2025-07-08 ENCOUNTER — APPOINTMENT (OUTPATIENT)
Dept: RADIOLOGY | Facility: REHABILITATION | Age: 65
End: 2025-07-08
Attending: PHYSICAL MEDICINE & REHABILITATION

## 2025-07-08 ENCOUNTER — HOSPITAL ENCOUNTER (OUTPATIENT)
Facility: REHABILITATION | Age: 65
End: 2025-07-08
Attending: PHYSICAL MEDICINE & REHABILITATION | Admitting: PHYSICAL MEDICINE & REHABILITATION

## 2025-07-08 VITALS
WEIGHT: 205.03 LBS | SYSTOLIC BLOOD PRESSURE: 132 MMHG | HEART RATE: 59 BPM | RESPIRATION RATE: 16 BRPM | BODY MASS INDEX: 25.49 KG/M2 | DIASTOLIC BLOOD PRESSURE: 92 MMHG | OXYGEN SATURATION: 98 % | HEIGHT: 75 IN | TEMPERATURE: 97.2 F

## 2025-07-08 PROCEDURE — 700111 HCHG RX REV CODE 636 W/ 250 OVERRIDE (IP): Mod: JZ

## 2025-07-08 PROCEDURE — 700117 HCHG RX CONTRAST REV CODE 255

## 2025-07-08 PROCEDURE — 20610 DRAIN/INJ JOINT/BURSA W/O US: CPT

## 2025-07-08 RX ORDER — LIDOCAINE HYDROCHLORIDE 10 MG/ML
INJECTION, SOLUTION EPIDURAL; INFILTRATION; INTRACAUDAL; PERINEURAL
Status: COMPLETED
Start: 2025-07-08 | End: 2025-07-08

## 2025-07-08 RX ORDER — DEXAMETHASONE SODIUM PHOSPHATE 10 MG/ML
INJECTION, SOLUTION INTRAMUSCULAR; INTRAVENOUS
Status: COMPLETED
Start: 2025-07-08 | End: 2025-07-08

## 2025-07-08 RX ADMIN — DEXAMETHASONE SODIUM PHOSPHATE 10 MG: 10 INJECTION, SOLUTION INTRAMUSCULAR; INTRAVENOUS at 11:10

## 2025-07-08 RX ADMIN — IOHEXOL 5 ML: 240 INJECTION, SOLUTION INTRATHECAL; INTRAVASCULAR; INTRAVENOUS; ORAL at 11:09

## 2025-07-08 RX ADMIN — LIDOCAINE HYDROCHLORIDE 10 ML: 10 INJECTION, SOLUTION EPIDURAL; INFILTRATION; INTRACAUDAL; PERINEURAL at 11:09

## 2025-07-08 ASSESSMENT — PAIN DESCRIPTION - PAIN TYPE: TYPE: CHRONIC PAIN

## 2025-07-08 NOTE — OP REPORT
Date of Service: 7/8/2025     Patient: Jim Lee 65 y.o. male     MRN: [id number]     Physician/s: Higinio Harrison MD    Pre-operative Diagnosis: right  hip osteoarthritis, hip pain    Post-operative Diagnosis: right hip osteoarthritis, hip pain    Procedure: right diagnostic and  therapeutic intra-articular Hip injection with fluoroscopic guidance    Description of procedure:    The risks, benefits, and alternatives of the procedure were reviewed and discussed with the patient.  Written informed consent was freely obtained. A pre-procedural time-out was conducted by the physician verifying patient’s identity, procedure to be performed, procedure site and side, and allergy verification. Appropriate equipment was determined to be in place for the procedure.         The femoral artery nerve and vein as well as the inguinal ligament were identified with ultrasound and marked with a marking pen.  The entire procedure took place lateral to the femoral vessels and nerve and inferior to the inguinal ligament.    In the procedure suite the patient was placed in a supine position with and the skin was prepped and draped in the usual sterile fashion. A 27-gauge 1.5 inch needle was used with approximately 2 mL of 1% lidocaine for local anesthesia at the junction of the RIGHT femoral head and neck.  A 25g 3.5 inch needle was placed into skin and advanced under fluoroscopic guidance into the joint space.A minimum amount of contrast was used to clearly demonstrate the outlining of the joint capsule. Following negative aspiration, 5mL of 1% lidocaine with 1mL of 10mg/mL dexamethasone was then injected into the joint, and the needle was subsequently removed intact after restyleted. The patient's hip was wiped with a 4x4 gauze, the area was cleansed with alcohol prep, and a bandaid was applied. There were no complications noted.     Patient had 100% pain relief postprocedure  Preprocedural pain: 5/10 NRS with FAIR  maneuver  Postprocedural pain: 0/10 NRS with FAIR maneuver    Follow-up as scheduled      Higinio Harrison MD  Interventional Spine and Pain  Physical Medicine and Rehabilitation  [facility]          CPT  Major joint/bursa:  20610  Fluoroscopic  needle guidance [id number]

## 2025-07-08 NOTE — H&P
Physical medicine and rehabilitation preprocedure history & Physical Note    Date  7/8/2025    Primary Care Physician  Bonita Freeman P.A.-C.    CC  Pre-Op Diagnosis Codes:      * Chronic pain of right hip [M25.551, G89.29]     * Right hip impingement syndrome [M25.851]     * Arthritis of both hips [M16.0]     HPI  This is a 65 y.o. male who presented with chronic hip pain.  Failed conservative treatments.    See previous notes of Dr. Harrison    Past Medical History[1]    Past Surgical History[2]    Current Medications[3]    Social History     Socioeconomic History    Marital status: Single     Spouse name: Not on file    Number of children: Not on file    Years of education: Not on file    Highest education level: Not on file   Occupational History    Occupation:    Tobacco Use    Smoking status: Never    Smokeless tobacco: Never   Vaping Use    Vaping status: Never Used   Substance and Sexual Activity    Alcohol use: Yes     Comment: 4 per week    Drug use: Not Currently     Comment: PRN CBD Gummy    Sexual activity: Not Currently     Partners: Female   Other Topics Concern    Not on file   Social History Narrative    Not on file     Social Drivers of Health     Financial Resource Strain: Not on File (8/24/2019)    Received from [employer]    Financial Resource Strain     Financial Resource Strain: 0   Food Insecurity: Not on File (8/24/2019)    Received from [employer]    Food Insecurity     Food: 0   Transportation Needs: Not on File (8/24/2019)    Received from [employer]    Transportation Needs     Transportation: 0   Physical Activity: Not on File (8/24/2019)    Received from [employer]    Physical Activity     Physical Activity: 0   Stress: Not on File (8/24/2019)    Received from [employer]    Stress     Stress: 0   Social Connections: Not on File (8/24/2019)    Received from [employer]    Social Connections     Social Connections and Isolation: 0   Intimate Partner Violence: Not on file   Housing Stability: Not on  File (2019)    Received from [employer]     Housin       Family History   Problem Relation Age of Onset    Cancer Father         aucostic neuromma, lung cancer       Allergies  Patient has no known allergies.    Review of Systems  Comprehensive review of systems was negative       Physical Exam  Constitutional:       General: He is not in acute distress.     Appearance: He is well-developed. He is not diaphoretic.   HENT:      Head: Normocephalic and atraumatic.   Eyes:      Pupils: Pupils are equal, round, and reactive to light.   Cardiovascular:      Rate and Rhythm: Normal rate.   Pulmonary:      Effort: Pulmonary effort is normal.      Breath sounds: Normal breath sounds.   Abdominal:      Palpations: Abdomen is soft.   Musculoskeletal:      Cervical back: Normal range of motion and neck supple.   Skin:     General: Skin is warm and dry.      Capillary Refill: Capillary refill takes less than 2 seconds.   Neurological:      Mental Status: He is alert and oriented to person, place, and time.   Psychiatric:         Behavior: Behavior normal.         Thought Content: Thought content normal.         Judgment: Judgment normal.          Vital Signs                        Vitals reviewed    Labs:                    Radiology:  DX-PORTABLE FLUOROSCOPY < 1 HOUR Reason For Exam: pain    (Results Pending)         Assessment/Plan:  Pre-Op Diagnosis Codes:      * Chronic pain of right hip [M25.551, G89.29]     * Right hip impingement syndrome [M25.851]     * Arthritis of both hips [M16.0]  Procedure(s):  Diagnostic and therapeutic Fluoroscopically guided intra-articular RIGHT hip injection         [1]   Past Medical History:  Diagnosis Date    Anxiety     Arthritis 2019    left knee   [2]   Past Surgical History:  Procedure Laterality Date    KNEE ARTHROSCOPY Right 2019    Procedure: ARTHROSCOPY, KNEE;  Surgeon: Freddy Teran M.D.;  Location: SURGERY [facility];  Service: Orthopedics     MENISCECTOMY, KNEE, MEDIAL  5/14/2019    Procedure: MENISCECTOMY, KNEE, MEDIAL-  PARTIAL, AND PARTIAL  LATERAL MENISCECTOMY;  Surgeon: Freddy Teran M.D.;  Location: [facility]GERY [facility];  Service: Orthopedics    CHONDROPLASTY  5/14/2019    Procedure: CHONDROPLASTY-  AND REPAIRS AS INDICATED;  Surgeon: Freddy Teran M.D.;  [facility];  Service: Orthopedics    HARDWARE REMOVAL ORTHO Left 12/2018    clavicle    ORIF, SHOULDER Left 11/2017    plates in shoulder    ORIF, FRACTURE, CLAVICLE Left 11/2017    plate in clavicle    KNEE ARTHROPLASTY TOTAL Left 2014   [3]   No current facility-administered medications for this encounter.

## 2025-07-08 NOTE — OR SURGEON
Immediate Post OP Note    Pre-Op Diagnosis Codes:      * Chronic pain of right hip [M25.551, G89.29]     * Right hip impingement syndrome [M25.851]     * Arthritis of both hips [M16.0]      Post-Op Diagnosis Codes:     * Chronic pain of right hip [M25.551, G89.29]     * Right hip impingement syndrome [M25.851]     * Arthritis of both hips [M16.0]      Procedure(s):  Diagnostic and therapeutic Fluoroscopically guided intra-articular RIGHT hip injection - Wound Class: Clean    Surgeon(s):  Higinio Harrison M.D.    Anesthesiologist/Type of Anesthesia:  No anesthesia staff entered./Local    Surgical Staff:  Circulator: Lashaun Martinez R.N.  Scrub Person: Dianne Bolanos  Radiology Technologist: Mirela Mckeon    Specimens removed if any:  * No specimens in log *    Estimated Blood Loss: None    Findings: None    Complications: None        7/8/2025 11:15 AM Higinio Harrison M.D.

## 2025-07-09 ENCOUNTER — TELEPHONE (OUTPATIENT)
Dept: PHYSICAL MEDICINE AND REHAB | Facility: MEDICAL CENTER | Age: 65
End: 2025-07-09

## 2025-07-09 NOTE — TELEPHONE ENCOUNTER
Called for post-sp check-up. Pt reported the following regarding the procedure site: iagnostic and therapeutic Fluoroscopically guided intra-articular RIGHT hip injection     Change in pain?: Yes    Concerns?: Np    Confirmed [facility] appt?: Yes

## 2025-07-30 ENCOUNTER — APPOINTMENT (OUTPATIENT)
Dept: MEDICAL GROUP | Facility: MEDICAL CENTER | Age: 65
End: 2025-07-30

## 2025-08-05 ENCOUNTER — APPOINTMENT (OUTPATIENT)
Dept: MEDICAL GROUP | Facility: MEDICAL CENTER | Age: 65
End: 2025-08-05

## 2025-08-05 ASSESSMENT — ENCOUNTER SYMPTOMS: BACK PAIN: 1

## 2025-08-11 ENCOUNTER — APPOINTMENT (OUTPATIENT)
Dept: PHYSICAL MEDICINE AND REHAB | Facility: MEDICAL CENTER | Age: 65
End: 2025-08-11
Payer: COMMERCIAL

## 2025-08-11 ASSESSMENT — PATIENT HEALTH QUESTIONNAIRE - PHQ9: CLINICAL INTERPRETATION OF PHQ2 SCORE: 0

## 2025-08-11 ASSESSMENT — PAIN SCALES - GENERAL: PAINLEVEL_OUTOF10: 3=SLIGHT PAIN

## 2025-08-12 ENCOUNTER — HOSPITAL ENCOUNTER (OUTPATIENT)
Dept: LAB | Facility: MEDICAL CENTER | Age: 65
End: 2025-08-12
Attending: PHYSICIAN ASSISTANT
Payer: COMMERCIAL

## 2025-08-12 DIAGNOSIS — Z12.5 SCREENING FOR MALIGNANT NEOPLASM OF PROSTATE: ICD-10-CM

## 2025-08-12 DIAGNOSIS — Z00.00 PREVENTATIVE HEALTH CARE: ICD-10-CM

## 2025-08-12 LAB
ALBUMIN SERPL BCP-MCNC: 4.3 G/DL (ref 3.2–4.9)
ALBUMIN/GLOB SERPL: 1.9 G/DL
ALP SERPL-CCNC: 48 U/L (ref 30–99)
ALT SERPL-CCNC: 17 U/L (ref 2–50)
ANION GAP SERPL CALC-SCNC: 11 MMOL/L (ref 7–16)
AST SERPL-CCNC: 25 U/L (ref 12–45)
BASOPHILS # BLD AUTO: 0.7 % (ref 0–1.8)
BASOPHILS # BLD: 0.04 K/UL (ref 0–0.12)
BILIRUB SERPL-MCNC: 0.6 MG/DL (ref 0.1–1.5)
BUN SERPL-MCNC: 19 MG/DL (ref 8–22)
CALCIUM ALBUM COR SERPL-MCNC: 9.3 MG/DL (ref 8.5–10.5)
CALCIUM SERPL-MCNC: 9.5 MG/DL (ref 8.5–10.5)
CHLORIDE SERPL-SCNC: 103 MMOL/L (ref 96–112)
CHOLEST SERPL-MCNC: 163 MG/DL (ref 100–199)
CO2 SERPL-SCNC: 24 MMOL/L (ref 20–33)
CREAT SERPL-MCNC: 1.19 MG/DL (ref 0.5–1.4)
EOSINOPHIL # BLD AUTO: 0.1 K/UL (ref 0–0.51)
EOSINOPHIL NFR BLD: 1.9 % (ref 0–6.9)
ERYTHROCYTE [DISTWIDTH] IN BLOOD BY AUTOMATED COUNT: 47.2 FL (ref 35.9–50)
FASTING STATUS PATIENT QL REPORTED: NORMAL
GFR SERPLBLD CREATININE-BSD FMLA CKD-EPI: 68 ML/MIN/1.73 M 2
GLOBULIN SER CALC-MCNC: 2.3 G/DL (ref 1.9–3.5)
GLUCOSE SERPL-MCNC: 81 MG/DL (ref 65–99)
HCT VFR BLD AUTO: 51.1 % (ref 42–52)
HDLC SERPL-MCNC: 53 MG/DL
HGB BLD-MCNC: 16.6 G/DL (ref 14–18)
IMM GRANULOCYTES # BLD AUTO: 0.01 K/UL (ref 0–0.11)
IMM GRANULOCYTES NFR BLD AUTO: 0.2 % (ref 0–0.9)
LDLC SERPL CALC-MCNC: 97 MG/DL
LYMPHOCYTES # BLD AUTO: 1.15 K/UL (ref 1–4.8)
LYMPHOCYTES NFR BLD: 21.4 % (ref 22–41)
MCH RBC QN AUTO: 31.8 PG (ref 27–33)
MCHC RBC AUTO-ENTMCNC: 32.5 G/DL (ref 32.3–36.5)
MCV RBC AUTO: 97.9 FL (ref 81.4–97.8)
MONOCYTES # BLD AUTO: 0.49 K/UL (ref 0–0.85)
MONOCYTES NFR BLD AUTO: 9.1 % (ref 0–13.4)
NEUTROPHILS # BLD AUTO: 3.59 K/UL (ref 1.82–7.42)
NEUTROPHILS NFR BLD: 66.7 % (ref 44–72)
NRBC # BLD AUTO: 0 K/UL
NRBC BLD-RTO: 0 /100 WBC (ref 0–0.2)
PLATELET # BLD AUTO: 273 K/UL (ref 164–446)
PMV BLD AUTO: 11.1 FL (ref 9–12.9)
POTASSIUM SERPL-SCNC: 4.8 MMOL/L (ref 3.6–5.5)
PROT SERPL-MCNC: 6.6 G/DL (ref 6–8.2)
PSA SERPL DL<=0.01 NG/ML-MCNC: 2.37 NG/ML (ref 0–4)
RBC # BLD AUTO: 5.22 M/UL (ref 4.7–6.1)
SODIUM SERPL-SCNC: 138 MMOL/L (ref 135–145)
TRIGL SERPL-MCNC: 64 MG/DL (ref 0–149)
TSH SERPL DL<=0.005 MIU/L-ACNC: 1.72 UIU/ML (ref 0.38–5.33)
WBC # BLD AUTO: 5.4 K/UL (ref 4.8–10.8)

## 2025-08-12 PROCEDURE — 85025 COMPLETE CBC W/AUTO DIFF WBC: CPT

## 2025-08-12 PROCEDURE — 84443 ASSAY THYROID STIM HORMONE: CPT

## 2025-08-12 PROCEDURE — 36415 COLL VENOUS BLD VENIPUNCTURE: CPT

## 2025-08-12 PROCEDURE — 84153 ASSAY OF PSA TOTAL: CPT

## 2025-08-12 PROCEDURE — 80061 LIPID PANEL: CPT

## 2025-08-12 PROCEDURE — 80053 COMPREHEN METABOLIC PANEL: CPT

## 2025-08-26 DIAGNOSIS — F41.8 SITUATIONAL ANXIETY: ICD-10-CM

## 2025-08-26 DIAGNOSIS — B35.1 ONYCHOMYCOSIS: ICD-10-CM

## 2025-08-26 RX ORDER — ALPRAZOLAM 0.5 MG
0.5 TABLET ORAL NIGHTLY PRN
Qty: 30 TABLET | Refills: 0 | Status: SHIPPED | OUTPATIENT
Start: 2025-08-26 | End: 2025-09-25

## 2025-08-26 RX ORDER — TERBINAFINE HYDROCHLORIDE 250 MG/1
250 TABLET ORAL DAILY
Qty: 90 TABLET | Refills: 0 | Status: SHIPPED | OUTPATIENT
Start: 2025-08-26

## (undated) DEVICE — TUBE CONNECTING SUCTION - CLEAR PLASTIC STERILE 72 IN (50EA/CA)

## (undated) DEVICE — BANDAGE ELASTIC 6 IN X 5 YDS - LATEX FREE (10/BX)

## (undated) DEVICE — BLADE SHAVER AGGRESSIVE PLUS 4.0MM ANGLED (5EA/BX)

## (undated) DEVICE — SUTURE 3-0 ETHILON FS-1 - (36/BX) 30 INCH

## (undated) DEVICE — DRAPE LARGE 3 QUARTER - (20/CA)

## (undated) DEVICE — NEPTUNE 4 PORT MANIFOLD - (20/PK)

## (undated) DEVICE — TUBING PATIENT W/CONNECTOR REDEUCE (1EA)

## (undated) DEVICE — NEEDLE SAFETY 18 GA X 1 1/2 IN (100EA/BX)

## (undated) DEVICE — KIT ROOM DECONTAMINATION

## (undated) DEVICE — BAG, SPONGE COUNT 50600

## (undated) DEVICE — GLOVE BIOGEL SZ 7 SURGICAL PF LTX - (50PR/BX 4BX/CA)

## (undated) DEVICE — DRESSING XEROFORM 1X8 - (50/BX 4BX/CA)

## (undated) DEVICE — SENSOR SPO2 NEO LNCS ADHESIVE (20/BX) SEE USER NOTES

## (undated) DEVICE — GLOVE SZ 7.5 LF PROTEXIS (50PR/BX)

## (undated) DEVICE — GOWN WARMING STANDARD FLEX - (30/CA)

## (undated) DEVICE — DRAPE U SPLIT IMP 54 X 76 - (24/CA)

## (undated) DEVICE — CHLORAPREP 26 ML APPLICATOR - ORANGE TINT(25/CA)

## (undated) DEVICE — PACK KNEE ARTHROSCOPY SM OR - (2EA/CA)

## (undated) DEVICE — SODIUM CHL. IRRIGATION 0.9% 3000ML (4EA/CA 65CA/PF)

## (undated) DEVICE — DRAPE LOWER EXTREMETY - (6/CA)

## (undated) DEVICE — TUBING PUMP WITH CONNECTOR REDEUCE (1EA)

## (undated) DEVICE — HEAD HOLDER JUNIOR/ADULT

## (undated) DEVICE — PROTECTOR ULNA NERVE - (36PR/CA)

## (undated) DEVICE — MASK ANESTHESIA ADULT  - (100/CA)

## (undated) DEVICE — GLOVE BIOGEL INDICATOR SZ 8 SURGICAL PF LTX - (50/BX 4BX/CA)

## (undated) DEVICE — SODIUM CHL IRRIGATION 0.9% 1000ML (12EA/CA)

## (undated) DEVICE — WATER IRRIGATION STERILE 1000ML (12EA/CA)

## (undated) DEVICE — SPONGE GAUZESTER 4 X 4 4PLY - (128PK/CA)

## (undated) DEVICE — SUCTION INSTRUMENT YANKAUER BULBOUS TIP W/O VENT (50EA/CA)

## (undated) DEVICE — ELECTRODE 850 FOAM ADHESIVE - HYDROGEL RADIOTRNSPRNT (50/PK)

## (undated) DEVICE — LACTATED RINGERS INJ 1000 ML - (14EA/CA 60CA/PF)

## (undated) DEVICE — ELECTRODE DUAL RETURN W/ CORD - (50/PK)

## (undated) DEVICE — GLOVE BIOGEL INDICATOR SZ 7.5 SURGICAL PF LTX - (50PR/BX 4BX/CA)

## (undated) DEVICE — SYRINGE 30 ML LL (56/BX)

## (undated) DEVICE — CANISTER SUCTION RIGID RED 1500CC (40EA/CA)

## (undated) DEVICE — GLOVE, LITE (PAIR)

## (undated) DEVICE — HUMID-VENT HEAT AND MOISTURE EXCHANGE- (50/BX)

## (undated) DEVICE — KIT ANESTHESIA W/CIRCUIT & 3/LT BAG W/FILTER (20EA/CA)

## (undated) DEVICE — SUTURE GENERAL